# Patient Record
Sex: FEMALE | Race: WHITE
[De-identification: names, ages, dates, MRNs, and addresses within clinical notes are randomized per-mention and may not be internally consistent; named-entity substitution may affect disease eponyms.]

---

## 2018-11-04 ENCOUNTER — HOSPITAL ENCOUNTER (EMERGENCY)
Dept: HOSPITAL 25 - UCCORT | Age: 28
Discharge: HOME | End: 2018-11-04
Payer: COMMERCIAL

## 2018-11-04 VITALS — SYSTOLIC BLOOD PRESSURE: 101 MMHG | DIASTOLIC BLOOD PRESSURE: 60 MMHG

## 2018-11-04 DIAGNOSIS — Z79.899: ICD-10-CM

## 2018-11-04 DIAGNOSIS — J02.9: Primary | ICD-10-CM

## 2018-11-04 DIAGNOSIS — F41.9: ICD-10-CM

## 2018-11-04 PROCEDURE — 99202 OFFICE O/P NEW SF 15 MIN: CPT

## 2018-11-04 PROCEDURE — G0463 HOSPITAL OUTPT CLINIC VISIT: HCPCS

## 2018-11-04 PROCEDURE — 87651 STREP A DNA AMP PROBE: CPT

## 2018-11-04 NOTE — UC
Throat Pain/Nasal [place] HPI





- HPI Summary


HPI Summary: 





Pt c/o sudden onset of ST that began on 11/2/18. Pt reports generalized fatigue 

and malaise and "low grade fever". 





- History of Current Complaint


Chief Complaint: UCGeneralIllness


Stated Complaint: ST/FEVER


Time Seen by Provider: 11/04/18 15:50


Hx Obtained From: Patient


Hx Last Menstrual Period: 10/18/18


Pregnant?: No


Onset/Duration: Sudden Onset, Lasting Days, Still Present


Severity: Mild


Pain Intensity: 3


Cough: None


Associated Signs & Symptoms: Positive: Dysphagia





- Epiglottits Risk Factors


Epiglottis Risk Factors: Sudden Onset





- Allergies/Home Medications


Allergies/Adverse Reactions: 


 Allergies











Allergy/AdvReac Type Severity Reaction Status Date / Time


 


No Known Allergies Allergy   Verified 11/04/18 15:49











Home Medications: 


 Home Medications





PARoxetine HCL TAB* [Paxil TAB*] 20 mg PO DAILY 11/04/18 [History Confirmed 11/ 04/18]











PMH/Surg Hx/FS Hx/Imm Hx


Previously Healthy: Yes


Psychological History: Anxiety





- Surgical History


Surgical History: None





- Family History


Known Family History: Positive: Cardiac Disease





- Social History


Occupation: Student


Lives: Alone


Alcohol Use: None


Substance Use Type: None


Smoking Status (MU): Never Smoked Tobacco


Have You Smoked in the Last Year: No





- Immunization History


Vaccination Up to Date: Yes





Review of Systems


Constitutional: Fever, Chills, Fatigue


Skin: Negative


Eyes: Negative


ENT: Sore Throat


Respiratory: Negative


Cardiovascular: Negative


Gastrointestinal: Negative


Genitourinary: Negative


Motor: Negative


Neurovascular: Negative


Musculoskeletal: Myalgia


Neurological: Negative


Psychological: Negative


Is Patient Immunocompromised?: No


All Other Systems Reviewed And Are Negative: Yes





Physical Exam


Triage Information Reviewed: Yes


Appearance: Well-Appearing


Vital Signs: 


 Initial Vital Signs











Temp  99.3 F   11/04/18 15:46


 


Pulse  70   11/04/18 15:46


 


Resp  20   11/04/18 15:46


 


BP  101/60   11/04/18 15:46


 


Pulse Ox  99   11/04/18 15:46











Vital Signs Reviewed: Yes


Eye Exam: Normal


ENT Exam: Normal


Dental Exam: Normal


Neck exam: Normal


Respiratory Exam: Normal


Cardiovascular Exam: Normal


Musculoskeletal Exam: Normal


Neurological Exam: Normal


Psychological Exam: Normal


Skin Exam: Normal





Diagnostics





- Laboratory


Diagnostic Studies Completed/Ordered: rapid strep: negative





Throat Pain/Nasal Course/Dx





- Differential Dx/Diagnosis


Differential Diagnosis/HQI/PQRI: Influenza, Mononucleosis, Pharyngitis, 

Tonsillitis, URI


Provider Diagnoses: pharyngitis





Discharge





- Sign-Out/Discharge


Documenting (check all that apply): Patient Departure


All imaging exams completed and their final reports reviewed: No Studies





- Discharge Plan


Condition: Stable


Disposition: HOME


Prescriptions: 


Amoxicillin PO (*) [Amoxicillin 500 MG CAP*] 500 mg PO Q12H #20 cap


Patient Education Materials:  Pharyngitis (ED)


Referrals: 


Evelin Herrera NP [Primary Care Provider] - If Needed





- Billing Disposition and Condition


Condition: STABLE


Disposition: Home